# Patient Record
(demographics unavailable — no encounter records)

---

## 2025-04-28 NOTE — ASSESSMENT
[FreeTextEntry1] : We again reviewed the anatomy of the flexor sheath and pathology of trigger fingers with the use of drawings and discussion.  The patient has failed injections/nonoperative treatment.  We discussed the possibility of surgery including the use of an open trigger finger release.  We discussed that too many injections may lead to weakening of the tendon/tendon rupture and that surgery is indicated at this point.  Risks include bleeding, infection, injury to nerves, vessels, tendons, stiffness, pain, loss of range of motion, loss of function, CRPS, and risks and complications of anesthesia.  We discussed the surgical plan and post op expectations.  We also discussed the possibility of prolonged pain/scar tissue and the possible need for therapy.  The patient is amenable to the risks, had the opportunity to ask questions, all questions were answered and the patient signed consent of their own accord.  They will be contacted by my surgical scheduler.  Pt will be scheduled for right ring trigger finger release.  increased risk with h/o DM

## 2025-04-28 NOTE — HISTORY OF PRESENT ILLNESS
[de-identified] : DOS: 6/30/23- RIGHT MIDDLE FINGER TRIGGER FINGER RELEASE DOS: 12/8/23- LEFT INDEX FINGER TRIGGER FINGER RELEASE   04/28/25 - here for right ring trigger finger, pain returned. Pt is requesting surgical intervention today.    5/28/24:  Pt was given CSI in right ring finger at last visit and is doing better  4/16/24:  Pt is working and is doing well but has some mild stiffness.  2/20/24:  Pt reports that he has been doing therapy and is improving  12/19/23:  Pt has stiffness in his left hand.  11/21/23: Pt is here for left index finger and wants to discuss surgery. Patient states right middle finger is doing well.  7/13/23:  Pt is doing well  6/19/23:  Pt reports that he felt no relief from right middle finger trigger finger CSI at last visit.  5/22/23:  Right middle finger triggering has recurred  5/8/23:  Triggering in left index finger has recurred.  Pt now has triggering in right middle finger.  4/26/22: Pt has had stiffness and clicking in the morning for 3 months  RHD diabetes, no insulin

## 2025-04-28 NOTE — IMAGING
[de-identified] : right ring finger TTP a1 pulley +triggering otherwise farom neurovascular intact distally  xray deferred today.